# Patient Record
Sex: FEMALE | URBAN - METROPOLITAN AREA
[De-identification: names, ages, dates, MRNs, and addresses within clinical notes are randomized per-mention and may not be internally consistent; named-entity substitution may affect disease eponyms.]

---

## 2020-11-12 ENCOUNTER — APPOINTMENT (RX ONLY)
Dept: URBAN - METROPOLITAN AREA CLINIC 125 | Facility: CLINIC | Age: 50
Setting detail: DERMATOLOGY
End: 2020-11-12

## 2020-11-12 DIAGNOSIS — L663 OTHER SPECIFIED DISEASES OF HAIR AND HAIR FOLLICLES: ICD-10-CM

## 2020-11-12 DIAGNOSIS — L57.0 ACTINIC KERATOSIS: ICD-10-CM

## 2020-11-12 DIAGNOSIS — L738 OTHER SPECIFIED DISEASES OF HAIR AND HAIR FOLLICLES: ICD-10-CM

## 2020-11-12 DIAGNOSIS — Q828 OTHER SPECIFIED ANOMALIES OF SKIN: ICD-10-CM

## 2020-11-12 DIAGNOSIS — D485 NEOPLASM OF UNCERTAIN BEHAVIOR OF SKIN: ICD-10-CM

## 2020-11-12 DIAGNOSIS — L81.4 OTHER MELANIN HYPERPIGMENTATION: ICD-10-CM

## 2020-11-12 DIAGNOSIS — L73.9 FOLLICULAR DISORDER, UNSPECIFIED: ICD-10-CM

## 2020-11-12 DIAGNOSIS — Q819 OTHER SPECIFIED ANOMALIES OF SKIN: ICD-10-CM

## 2020-11-12 DIAGNOSIS — D22 MELANOCYTIC NEVI: ICD-10-CM

## 2020-11-12 DIAGNOSIS — L82.1 OTHER SEBORRHEIC KERATOSIS: ICD-10-CM

## 2020-11-12 DIAGNOSIS — Z71.89 OTHER SPECIFIED COUNSELING: ICD-10-CM

## 2020-11-12 DIAGNOSIS — Q826 OTHER SPECIFIED ANOMALIES OF SKIN: ICD-10-CM

## 2020-11-12 PROBLEM — D48.5 NEOPLASM OF UNCERTAIN BEHAVIOR OF SKIN: Status: ACTIVE | Noted: 2020-11-12

## 2020-11-12 PROBLEM — D22.5 MELANOCYTIC NEVI OF TRUNK: Status: ACTIVE | Noted: 2020-11-12

## 2020-11-12 PROBLEM — L02.224 FURUNCLE OF GROIN: Status: ACTIVE | Noted: 2020-11-12

## 2020-11-12 PROBLEM — L85.8 OTHER SPECIFIED EPIDERMAL THICKENING: Status: ACTIVE | Noted: 2020-11-12

## 2020-11-12 PROCEDURE — ? OTHER

## 2020-11-12 PROCEDURE — 99203 OFFICE O/P NEW LOW 30 MIN: CPT | Mod: 25

## 2020-11-12 PROCEDURE — 17003 DESTRUCT PREMALG LES 2-14: CPT

## 2020-11-12 PROCEDURE — ? OBSERVATION

## 2020-11-12 PROCEDURE — ? COUNSELING

## 2020-11-12 PROCEDURE — ? BIOPSY BY SHAVE METHOD

## 2020-11-12 PROCEDURE — ? PRESCRIPTION

## 2020-11-12 PROCEDURE — 11102 TANGNTL BX SKIN SINGLE LES: CPT

## 2020-11-12 PROCEDURE — ? TREATMENT REGIMEN

## 2020-11-12 PROCEDURE — ? MEDICATION COUNSELING

## 2020-11-12 PROCEDURE — 17000 DESTRUCT PREMALG LESION: CPT | Mod: 59

## 2020-11-12 PROCEDURE — ? LIQUID NITROGEN

## 2020-11-12 RX ORDER — CLINDAMYCIN PHOSPHATE 10 MG/ML
1 LOTION TOPICAL BID
Qty: 1 | Refills: 3 | Status: ERX | COMMUNITY
Start: 2020-11-12

## 2020-11-12 RX ORDER — TRIAMCINOLONE ACETONIDE 1 MG/G
1 CREAM TOPICAL BID
Qty: 1 | Refills: 1 | Status: ERX | COMMUNITY
Start: 2020-11-12

## 2020-11-12 RX ADMIN — TRIAMCINOLONE ACETONIDE 1: 1 CREAM TOPICAL at 00:00

## 2020-11-12 RX ADMIN — CLINDAMYCIN PHOSPHATE 1: 10 LOTION TOPICAL at 00:00

## 2020-11-12 ASSESSMENT — LOCATION ZONE DERM
LOCATION ZONE: ARM
LOCATION ZONE: TRUNK
LOCATION ZONE: FACE
LOCATION ZONE: LEG

## 2020-11-12 ASSESSMENT — LOCATION SIMPLE DESCRIPTION DERM
LOCATION SIMPLE: ABDOMEN
LOCATION SIMPLE: UPPER BACK
LOCATION SIMPLE: GENITALIA
LOCATION SIMPLE: LEFT ZYGOMA
LOCATION SIMPLE: LEFT CHEEK
LOCATION SIMPLE: RIGHT POSTERIOR UPPER ARM
LOCATION SIMPLE: LEFT POSTERIOR UPPER ARM
LOCATION SIMPLE: RIGHT CALF

## 2020-11-12 ASSESSMENT — LOCATION DETAILED DESCRIPTION DERM
LOCATION DETAILED: GENITALIA
LOCATION DETAILED: SUPERIOR THORACIC SPINE
LOCATION DETAILED: RIGHT PROXIMAL POSTERIOR UPPER ARM
LOCATION DETAILED: LEFT PROXIMAL POSTERIOR UPPER ARM
LOCATION DETAILED: LEFT SUPERIOR LATERAL MALAR CHEEK
LOCATION DETAILED: LEFT CENTRAL MALAR CHEEK
LOCATION DETAILED: LEFT LATERAL ZYGOMA
LOCATION DETAILED: SUBXIPHOID
LOCATION DETAILED: RIGHT PROXIMAL CALF

## 2020-11-12 NOTE — PROCEDURE: OTHER
Note Text (......Xxx Chief Complaint.): This diagnosis correlates with the
Detail Level: Zone
Other (Free Text): This was biopsied at [facility] and per pt showed atypical [place], no grade, but excision planned however was never performed. Will biopsy today and may need re-excision

## 2020-11-12 NOTE — PROCEDURE: LIQUID NITROGEN
Duration Of Freeze Thaw-Cycle (Seconds): 10
Post-Care Instructions: I reviewed with the patient in detail post-care instructions. Patient is to wear sunprotection, and avoid picking at any of the treated lesions. Pt may apply Vaseline to crusted or scabbing areas.
Number Of Freeze-Thaw Cycles: 2 freeze-thaw cycles
Render Note In Bullet Format When Appropriate: No
Consent: The patient's consent was obtained including but not limited to risks of crusting, scabbing, blistering, scarring, darker or lighter pigmentary change, recurrence, incomplete removal and infection.
Detail Level: Detailed

## 2020-11-12 NOTE — PROCEDURE: TREATMENT REGIMEN
Plan: RTC if area persists
Detail Level: Zone
Initiate Treatment: clindamycin 1 % lotion BID\\nDays Supply: 30\\nSig: Apply thin layer to affected areas once or twice daily as needed. Repeat for any new flaring

## 2022-11-10 ENCOUNTER — APPOINTMENT (RX ONLY)
Dept: URBAN - METROPOLITAN AREA CLINIC 125 | Facility: CLINIC | Age: 52
Setting detail: DERMATOLOGY
End: 2022-11-10

## 2022-11-10 DIAGNOSIS — L738 OTHER SPECIFIED DISEASES OF HAIR AND HAIR FOLLICLES: ICD-10-CM

## 2022-11-10 DIAGNOSIS — D36.1 BENIGN NEOPLASM OF PERIPHERAL NERVES AND AUTONOMIC NERVOUS SYSTEM: ICD-10-CM

## 2022-11-10 DIAGNOSIS — D22 MELANOCYTIC NEVI: ICD-10-CM

## 2022-11-10 DIAGNOSIS — L28.1 PRURIGO NODULARIS: ICD-10-CM

## 2022-11-10 DIAGNOSIS — L663 OTHER SPECIFIED DISEASES OF HAIR AND HAIR FOLLICLES: ICD-10-CM

## 2022-11-10 DIAGNOSIS — L81.4 OTHER MELANIN HYPERPIGMENTATION: ICD-10-CM

## 2022-11-10 DIAGNOSIS — D18.0 HEMANGIOMA: ICD-10-CM

## 2022-11-10 DIAGNOSIS — L82.1 OTHER SEBORRHEIC KERATOSIS: ICD-10-CM

## 2022-11-10 DIAGNOSIS — L90.5 SCAR CONDITIONS AND FIBROSIS OF SKIN: ICD-10-CM

## 2022-11-10 DIAGNOSIS — L73.9 FOLLICULAR DISORDER, UNSPECIFIED: ICD-10-CM

## 2022-11-10 PROBLEM — D36.12 BENIGN NEOPLASM OF PERIPHERAL NERVES AND AUTONOMIC NERVOUS SYSTEM, UPPER LIMB, INCLUDING SHOULDER: Status: ACTIVE | Noted: 2022-11-10

## 2022-11-10 PROBLEM — D18.01 HEMANGIOMA OF SKIN AND SUBCUTANEOUS TISSUE: Status: ACTIVE | Noted: 2022-11-10

## 2022-11-10 PROBLEM — D22.5 MELANOCYTIC NEVI OF TRUNK: Status: ACTIVE | Noted: 2022-11-10

## 2022-11-10 PROBLEM — L02.224 FURUNCLE OF GROIN: Status: ACTIVE | Noted: 2022-11-10

## 2022-11-10 PROCEDURE — ? INTRALESIONAL KENALOG

## 2022-11-10 PROCEDURE — ? COUNSELING

## 2022-11-10 PROCEDURE — 99214 OFFICE O/P EST MOD 30 MIN: CPT | Mod: 25

## 2022-11-10 PROCEDURE — 11900 INJECT SKIN LESIONS </W 7: CPT

## 2022-11-10 PROCEDURE — ? ADDITIONAL NOTES

## 2022-11-10 PROCEDURE — ? PRESCRIPTION

## 2022-11-10 PROCEDURE — ? PRESCRIPTION MEDICATION MANAGEMENT

## 2022-11-10 RX ORDER — CLINDAMYCIN PHOSPHATE 10 MG/ML
1 LOTION TOPICAL QD
Qty: 60 | Refills: 11 | Status: ERX

## 2022-11-10 ASSESSMENT — LOCATION SIMPLE DESCRIPTION DERM
LOCATION SIMPLE: RIGHT PRETIBIAL REGION
LOCATION SIMPLE: GENITALIA
LOCATION SIMPLE: LEFT UPPER ARM
LOCATION SIMPLE: RIGHT FOOT
LOCATION SIMPLE: UPPER BACK
LOCATION SIMPLE: RIGHT THIGH
LOCATION SIMPLE: RIGHT UPPER BACK
LOCATION SIMPLE: LEFT UPPER BACK
LOCATION SIMPLE: RIGHT BREAST

## 2022-11-10 ASSESSMENT — LOCATION DETAILED DESCRIPTION DERM
LOCATION DETAILED: RIGHT SUPERIOR MEDIAL UPPER BACK
LOCATION DETAILED: LEFT ANTERIOR DISTAL UPPER ARM
LOCATION DETAILED: SUPERIOR THORACIC SPINE
LOCATION DETAILED: GENITALIA
LOCATION DETAILED: RIGHT DORSAL FOOT
LOCATION DETAILED: LEFT SUPERIOR MEDIAL UPPER BACK
LOCATION DETAILED: RIGHT ANTERIOR PROXIMAL THIGH
LOCATION DETAILED: RIGHT DISTAL PRETIBIAL REGION
LOCATION DETAILED: RIGHT PERIAREOLAR BREAST 10-11:00 REGION

## 2022-11-10 ASSESSMENT — LOCATION ZONE DERM
LOCATION ZONE: TRUNK
LOCATION ZONE: LEG
LOCATION ZONE: ARM
LOCATION ZONE: FEET

## 2022-11-10 NOTE — PROCEDURE: ADDITIONAL NOTES
Detail Level: Simple
Render Risk Assessment In Note?: no
Additional Notes: Briefly discussed PRX laser and estimated costs

## 2022-11-10 NOTE — PROCEDURE: PRESCRIPTION MEDICATION MANAGEMENT
Render In Strict Bullet Format?: No
Continue Regimen: clindamycin 1 % lotion -Apply to affected areas twice daily as needed
Detail Level: Zone

## 2022-11-10 NOTE — PROCEDURE: INTRALESIONAL KENALOG
How Many Mls Were Removed From The 10 Mg/Ml (5ml) Vial When Preparing The Injectable Solution?: 0
Include Z78.9 (Other Specified Conditions Influencing Health Status) As An Associated Diagnosis?: No
Total Volume Injected (Ccs- Only Use Numbers And Decimals): .5
Validate Note Data When Using Inventory: Yes
Medical Necessity Clause: This procedure was medically necessary because the lesions that were treated were:
Treatment Number (Optional): 1
Detail Level: Detailed
Administered By (Optional): Dr. Sanchez
Kenalog Preparation: Kenalog
Concentration Of Solution Injected (Mg/Ml): 10.0
Consent: The risks of atrophy were reviewed with the patient.

## 2023-11-15 ENCOUNTER — APPOINTMENT (RX ONLY)
Dept: URBAN - METROPOLITAN AREA CLINIC 125 | Facility: CLINIC | Age: 53
Setting detail: DERMATOLOGY
End: 2023-11-15

## 2023-11-15 DIAGNOSIS — D18.0 HEMANGIOMA: ICD-10-CM

## 2023-11-15 DIAGNOSIS — L57.3 POIKILODERMA OF CIVATTE: ICD-10-CM

## 2023-11-15 DIAGNOSIS — Z71.89 OTHER SPECIFIED COUNSELING: ICD-10-CM

## 2023-11-15 DIAGNOSIS — D485 NEOPLASM OF UNCERTAIN BEHAVIOR OF SKIN: ICD-10-CM

## 2023-11-15 DIAGNOSIS — D22 MELANOCYTIC NEVI: ICD-10-CM

## 2023-11-15 PROBLEM — D18.01 HEMANGIOMA OF SKIN AND SUBCUTANEOUS TISSUE: Status: ACTIVE | Noted: 2023-11-15

## 2023-11-15 PROBLEM — D22.5 MELANOCYTIC NEVI OF TRUNK: Status: ACTIVE | Noted: 2023-11-15

## 2023-11-15 PROBLEM — D48.5 NEOPLASM OF UNCERTAIN BEHAVIOR OF SKIN: Status: ACTIVE | Noted: 2023-11-15

## 2023-11-15 PROCEDURE — 99213 OFFICE O/P EST LOW 20 MIN: CPT

## 2023-11-15 PROCEDURE — ? COUNSELING

## 2023-11-15 PROCEDURE — ? TREATMENT REGIMEN

## 2023-11-15 PROCEDURE — ? OBSERVATION AND MEASURE

## 2023-11-15 ASSESSMENT — LOCATION SIMPLE DESCRIPTION DERM
LOCATION SIMPLE: CHEST
LOCATION SIMPLE: ABDOMEN

## 2023-11-15 ASSESSMENT — LOCATION DETAILED DESCRIPTION DERM
LOCATION DETAILED: LEFT LATERAL ABDOMEN
LOCATION DETAILED: PERIUMBILICAL SKIN
LOCATION DETAILED: STERNAL NOTCH

## 2023-11-15 ASSESSMENT — LOCATION ZONE DERM: LOCATION ZONE: TRUNK

## 2023-11-15 NOTE — PROCEDURE: OBSERVATION
Detail Level: Simple
Size Of Lesion: 2 x 1 mm
Instructions (Optional): offered biopsy, pt declined and elects to monitor

## 2024-11-21 ENCOUNTER — APPOINTMENT (RX ONLY)
Dept: URBAN - METROPOLITAN AREA CLINIC 89 | Facility: CLINIC | Age: 54
Setting detail: DERMATOLOGY
End: 2024-11-21

## 2024-11-21 DIAGNOSIS — D22 MELANOCYTIC NEVI: ICD-10-CM

## 2024-11-21 DIAGNOSIS — L81.4 OTHER MELANIN HYPERPIGMENTATION: ICD-10-CM

## 2024-11-21 DIAGNOSIS — L57.0 ACTINIC KERATOSIS: ICD-10-CM

## 2024-11-21 DIAGNOSIS — L73.9 FOLLICULAR DISORDER, UNSPECIFIED: ICD-10-CM

## 2024-11-21 DIAGNOSIS — L82.1 OTHER SEBORRHEIC KERATOSIS: ICD-10-CM

## 2024-11-21 DIAGNOSIS — L50.3 DERMATOGRAPHIC URTICARIA: ICD-10-CM

## 2024-11-21 DIAGNOSIS — D485 NEOPLASM OF UNCERTAIN BEHAVIOR OF SKIN: ICD-10-CM

## 2024-11-21 DIAGNOSIS — L663 OTHER SPECIFIED DISEASES OF HAIR AND HAIR FOLLICLES: ICD-10-CM

## 2024-11-21 DIAGNOSIS — Z71.89 OTHER SPECIFIED COUNSELING: ICD-10-CM

## 2024-11-21 DIAGNOSIS — L738 OTHER SPECIFIED DISEASES OF HAIR AND HAIR FOLLICLES: ICD-10-CM

## 2024-11-21 PROBLEM — D22.5 MELANOCYTIC NEVI OF TRUNK: Status: ACTIVE | Noted: 2024-11-21

## 2024-11-21 PROBLEM — L02.224 FURUNCLE OF GROIN: Status: ACTIVE | Noted: 2024-11-21

## 2024-11-21 PROBLEM — D48.5 NEOPLASM OF UNCERTAIN BEHAVIOR OF SKIN: Status: ACTIVE | Noted: 2024-11-21

## 2024-11-21 PROCEDURE — ? TREATMENT REGIMEN

## 2024-11-21 PROCEDURE — ? BIOPSY BY SHAVE METHOD

## 2024-11-21 PROCEDURE — 99214 OFFICE O/P EST MOD 30 MIN: CPT | Mod: 25

## 2024-11-21 PROCEDURE — ? COUNSELING

## 2024-11-21 PROCEDURE — ? PRESCRIPTION MEDICATION MANAGEMENT

## 2024-11-21 PROCEDURE — 17000 DESTRUCT PREMALG LESION: CPT | Mod: 59

## 2024-11-21 PROCEDURE — ? LIQUID NITROGEN

## 2024-11-21 PROCEDURE — ? PRESCRIPTION

## 2024-11-21 PROCEDURE — 11102 TANGNTL BX SKIN SINGLE LES: CPT

## 2024-11-21 RX ORDER — CLINDAMYCIN PHOSPHATE 10 MG/ML
1 LOTION TOPICAL BID
Qty: 60 | Refills: 5 | Status: ERX

## 2024-11-21 ASSESSMENT — LOCATION DETAILED DESCRIPTION DERM
LOCATION DETAILED: SUPERIOR LUMBAR SPINE
LOCATION DETAILED: LEFT PROXIMAL PRETIBIAL REGION
LOCATION DETAILED: LEFT INFERIOR MEDIAL MIDBACK
LOCATION DETAILED: RIGHT SUPERIOR MEDIAL UPPER BACK
LOCATION DETAILED: LEFT CENTRAL ZYGOMA
LOCATION DETAILED: GENITALIA

## 2024-11-21 ASSESSMENT — LOCATION SIMPLE DESCRIPTION DERM
LOCATION SIMPLE: GENITALIA
LOCATION SIMPLE: LEFT LOWER BACK
LOCATION SIMPLE: LOWER BACK
LOCATION SIMPLE: RIGHT UPPER BACK
LOCATION SIMPLE: LEFT ZYGOMA
LOCATION SIMPLE: LEFT PRETIBIAL REGION

## 2024-11-21 ASSESSMENT — LOCATION ZONE DERM
LOCATION ZONE: FACE
LOCATION ZONE: LEG
LOCATION ZONE: TRUNK

## 2024-11-21 NOTE — PROCEDURE: MIPS QUALITY
Quality 394a: Meningococcal Immunizations For Adolescents: Patient had anaphylaxis due to the meningococcal vaccine any time on or before the patient’s 13th birthday
Quality 226: Preventive Care And Screening: Tobacco Use: Screening And Cessation Intervention: Patient screened for tobacco use and is an ex/non-smoker
Detail Level: Detailed
Quality 130: Documentation Of Current Medications In The Medical Record: Current Medications Documented
Quality 431: Preventive Care And Screening: Unhealthy Alcohol Use - Screening: Patient not identified as an unhealthy alcohol user when screened for unhealthy alcohol use using a systematic screening method

## 2025-01-02 ENCOUNTER — RX ONLY (RX ONLY)
Age: 55
End: 2025-01-02

## 2025-01-02 RX ORDER — CLINDAMYCIN PHOSPHATE 10 MG/ML
1 LOTION TOPICAL BID
Qty: 60 | Refills: 6 | Status: ERX